# Patient Record
Sex: MALE | Race: WHITE | NOT HISPANIC OR LATINO | Employment: FULL TIME | URBAN - NONMETROPOLITAN AREA
[De-identification: names, ages, dates, MRNs, and addresses within clinical notes are randomized per-mention and may not be internally consistent; named-entity substitution may affect disease eponyms.]

---

## 2021-06-29 ENCOUNTER — HOSPITAL ENCOUNTER (EMERGENCY)
Facility: OTHER | Age: 26
Discharge: HOME | End: 2021-06-29
Attending: PHYSICIAN ASSISTANT | Admitting: PHYSICIAN ASSISTANT
Payer: COMMERCIAL

## 2021-06-29 ENCOUNTER — APPOINTMENT (OUTPATIENT)
Dept: ULTRASOUND IMAGING | Facility: OTHER | Age: 26
End: 2021-06-29
Attending: PHYSICIAN ASSISTANT
Payer: COMMERCIAL

## 2021-06-29 VITALS
DIASTOLIC BLOOD PRESSURE: 93 MMHG | HEART RATE: 94 BPM | RESPIRATION RATE: 18 BRPM | BODY MASS INDEX: 32.29 KG/M2 | WEIGHT: 218 LBS | TEMPERATURE: 97.5 F | OXYGEN SATURATION: 99 % | SYSTOLIC BLOOD PRESSURE: 143 MMHG | HEIGHT: 69 IN

## 2021-06-29 DIAGNOSIS — R36.9 PENILE DISCHARGE: ICD-10-CM

## 2021-06-29 LAB
ALBUMIN UR-MCNC: 10 MG/DL
APPEARANCE UR: CLEAR
BACTERIA #/AREA URNS HPF: ABNORMAL /HPF
BILIRUB UR QL STRIP: NEGATIVE
C TRACH DNA SPEC QL NAA+PROBE: NOT DETECTED
COLOR UR AUTO: ABNORMAL
GLUCOSE UR STRIP-MCNC: NEGATIVE MG/DL
HGB UR QL STRIP: ABNORMAL
KETONES UR STRIP-MCNC: NEGATIVE MG/DL
LEUKOCYTE ESTERASE UR QL STRIP: ABNORMAL
MUCOUS THREADS #/AREA URNS LPF: PRESENT /LPF
N GONORRHOEA DNA SPEC QL NAA+PROBE: NOT DETECTED
NITRATE UR QL: NEGATIVE
PH UR STRIP: 6.5 PH (ref 5–7)
RBC #/AREA URNS AUTO: 35 /HPF (ref 0–2)
SOURCE: ABNORMAL
SP GR UR STRIP: 1.02 (ref 1–1.03)
SPECIMEN SOURCE: NORMAL
UROBILINOGEN UR STRIP-MCNC: 2 MG/DL (ref 0–2)
WBC #/AREA URNS AUTO: 56 /HPF (ref 0–5)
WBC CLUMPS #/AREA URNS HPF: PRESENT /HPF

## 2021-06-29 PROCEDURE — 87661 TRICHOMONAS VAGINALIS AMPLIF: CPT | Performed by: PHYSICIAN ASSISTANT

## 2021-06-29 PROCEDURE — 87086 URINE CULTURE/COLONY COUNT: CPT | Performed by: PHYSICIAN ASSISTANT

## 2021-06-29 PROCEDURE — 87088 URINE BACTERIA CULTURE: CPT | Performed by: PHYSICIAN ASSISTANT

## 2021-06-29 PROCEDURE — 250N000013 HC RX MED GY IP 250 OP 250 PS 637: Performed by: PHYSICIAN ASSISTANT

## 2021-06-29 PROCEDURE — 250N000011 HC RX IP 250 OP 636: Performed by: PHYSICIAN ASSISTANT

## 2021-06-29 PROCEDURE — 86780 TREPONEMA PALLIDUM: CPT | Performed by: PHYSICIAN ASSISTANT

## 2021-06-29 PROCEDURE — 81001 URINALYSIS AUTO W/SCOPE: CPT | Performed by: PHYSICIAN ASSISTANT

## 2021-06-29 PROCEDURE — 87491 CHLMYD TRACH DNA AMP PROBE: CPT | Performed by: PHYSICIAN ASSISTANT

## 2021-06-29 PROCEDURE — 87591 N.GONORRHOEAE DNA AMP PROB: CPT | Performed by: PHYSICIAN ASSISTANT

## 2021-06-29 PROCEDURE — 76870 US EXAM SCROTUM: CPT

## 2021-06-29 PROCEDURE — 96372 THER/PROPH/DIAG INJ SC/IM: CPT | Performed by: PHYSICIAN ASSISTANT

## 2021-06-29 PROCEDURE — 99283 EMERGENCY DEPT VISIT LOW MDM: CPT | Performed by: PHYSICIAN ASSISTANT

## 2021-06-29 PROCEDURE — 99284 EMERGENCY DEPT VISIT MOD MDM: CPT | Mod: 25 | Performed by: PHYSICIAN ASSISTANT

## 2021-06-29 PROCEDURE — 36415 COLL VENOUS BLD VENIPUNCTURE: CPT | Performed by: PHYSICIAN ASSISTANT

## 2021-06-29 PROCEDURE — 99284 EMERGENCY DEPT VISIT MOD MDM: CPT | Performed by: PHYSICIAN ASSISTANT

## 2021-06-29 RX ORDER — METRONIDAZOLE 500 MG/1
500 TABLET ORAL ONCE
Status: COMPLETED | OUTPATIENT
Start: 2021-06-29 | End: 2021-06-29

## 2021-06-29 RX ORDER — DOXYCYCLINE 100 MG/1
100 CAPSULE ORAL ONCE
Status: COMPLETED | OUTPATIENT
Start: 2021-06-29 | End: 2021-06-29

## 2021-06-29 RX ORDER — DOXYCYCLINE 100 MG/1
100 CAPSULE ORAL 2 TIMES DAILY
Qty: 14 CAPSULE | Refills: 0 | Status: SHIPPED | OUTPATIENT
Start: 2021-06-29 | End: 2021-07-06

## 2021-06-29 RX ORDER — METRONIDAZOLE 500 MG/1
500 TABLET ORAL 2 TIMES DAILY
Qty: 14 TABLET | Refills: 0 | Status: SHIPPED | OUTPATIENT
Start: 2021-06-29 | End: 2021-07-06

## 2021-06-29 RX ADMIN — CEFTRIAXONE SODIUM 500 MG: 500 INJECTION, POWDER, FOR SOLUTION INTRAMUSCULAR; INTRAVENOUS at 20:17

## 2021-06-29 RX ADMIN — METRONIDAZOLE 500 MG: 500 TABLET, FILM COATED ORAL at 20:35

## 2021-06-29 RX ADMIN — DOXYCYCLINE 100 MG: 100 CAPSULE ORAL at 20:18

## 2021-06-29 ASSESSMENT — ENCOUNTER SYMPTOMS
CHILLS: 0
CHEST TIGHTNESS: 0
WOUND: 0
BACK PAIN: 0
CONFUSION: 0
BRUISES/BLEEDS EASILY: 0
SHORTNESS OF BREATH: 0
HEMATURIA: 0
FEVER: 0
ABDOMINAL PAIN: 0
ADENOPATHY: 0

## 2021-06-29 ASSESSMENT — MIFFLIN-ST. JEOR: SCORE: 1969.22
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
## 2021-06-30 LAB
SPECIMEN SOURCE: NORMAL
T PALLIDUM AB SER QL: NONREACTIVE
T VAGINALIS DNA SPEC QL NAA+PROBE: NORMAL

## 2021-06-30 NOTE — DISCHARGE INSTRUCTIONS
Get plenty of fluids and rest.  As we discussed you were negative for STDs were able to test for today.  Do have a lab test that is pending.  Your ultrasound appeared very well with no concerning findings.  However, your urine did show signs of infection such as increased white cells and bacteria however this would be very odd for otherwise healthy male did have a UTI more than likely you are suffering from an STD.  I recommend no further sexual intercourse until your symptoms completely resolved.  Referral was placed for you to follow-up with urology for reassessment.  Take your antibiotics as directed.  Return if there are worsening concerning symptoms.

## 2021-06-30 NOTE — ED TRIAGE NOTES
"ED Nursing Triage Note (General)   ________________________________    Arnold Santos is a 26 year old Male that presents to triage private car  With history of  Left testicle pain that started today. Pt says he had burning with urination on Saturday but then it cleared up, today it started to burn again and now he his left testicle hurts. Pt denies swelling or discoloration, says he had some cream colored discharge that he was able to squeeze out, reported by patient   Significant symptoms had onset 3 day(s) ago.  BP (!) 163/104   Pulse 100   Temp 97.5  F (36.4  C) (Temporal)   Resp 18   Ht 1.753 m (5' 9\")   Wt 98.9 kg (218 lb)   SpO2 98%   BMI 32.19 kg/m  t  Patient appears alert , in no acute distress., and cooperative behavior.    GCS Total = 15  Airway: intact  Breathing noted as Normal  Circulation Normal  Skin:  Normal  Action taken:  Triage to critical care immediately  "

## 2021-07-01 LAB
BACTERIA SPEC CULT: ABNORMAL
SPECIMEN SOURCE: ABNORMAL

## 2021-07-05 ENCOUNTER — TELEPHONE (OUTPATIENT)
Dept: EMERGENCY MEDICINE | Facility: OTHER | Age: 26
End: 2021-07-05

## 2021-07-05 NOTE — TELEPHONE ENCOUNTER
[facility] Emergency Department Lab result notification     Patient/parent Name  Arnold    Reason for call  Patient requesting lab result    Lab Result  Final Urine Culture Report on 7/1/21  [facility] Emergency Dept discharge antibiotic prescribed: Doxycycline 100 mg PO tablet, 1 tablet (100 mg) by mouth 2 times daily for 7 days  #1. Bacteria, 50,000 to 100,000 colonies/mL Escherichia coli, is SUSCEPTIBLE to Antibiotic.    Recommendations in treatment per [facility] ED lab result Urine Culture protocol.     All other labs were negative (Gonorrhea, Chlamydia, Treponema, and Trichmans)    Current symptoms  Symptoms are improving.  Has some mild discomfort in scrotum    Recommendations/Instructions  Contact your PCP clinic or return to the Emergency department if your:    Symptoms do not improved after 3 days on antibiotic.    Symptoms do not resolve after completing antibiotic.    Symptoms worsen or other concerning symptom's.    Simón Jean RN  [facility] [place]  Emergency Dept Lab Result RN  Ph# [phone]     Copy of Lab result     Urine Culture Aerobic Bacterial  Order: [id number]  Status:  Final result   Visible to patient:  No (inaccessible [facility]   Dx:  Penile discharge  Specimen Information: Urine, Voided; Midstream Urine        (important suggestion)  Newer results are available. Click to view them now.   Component 6d ago   Specimen Description Midstream Urine    Culture Micro Abnormal   50,000 to 100,000 colonies/mL   Escherichia coli     Resulting Agency [facility]   Susceptibility     Escherichia coli     EDUARDO     Amoxicillin/Clav <=8/4 ug/mL Sensitive     AMPICILLIN >16 ug/mL Resistant     AMPICILLIN/SULBACTAM >16/8 ug/mL Resistant     AZTREONAM <=4 ug/mL Sensitive     CEFAZOLIN <=2 ug/mL Sensitive1     CEFEPIME <=2 ug/mL Sensitive     CEFOXITIN <=8 ug/mL Sensitive     Cefpodoxime <=2 ug/mL Sensitive      CEFTAZIDIME <=1 ug/mL Sensitive     CEFTRIAXONE <=1 ug/mL Sensitive     CEFUROXIME <=4 ug/mL Sensitive     CIPROFLOXACIN <=1 ug/mL Sensitive     ERTAPENEM <=0.5 ug/mL Sensitive     GENTAMICIN <=4 ug/mL Sensitive     IMIPENEM <=1 ug/mL Sensitive     LEVOFLOXACIN <=2 ug/mL Sensitive     NITROFURANTOIN <=32 ug/mL Sensitive     Piperacillin/Tazo <=16 ug/mL Sensitive     TETRACYCLINE <=4 ug/mL Sensitive     TOBRAMYCIN <=4 ug/mL Sensitive     TRIMETHOPRIM  Sensitive2     Trimethoprim/Sulfa <=2/38 ug/mL Sensitive           1 Cefazolin EDUARDO breakpoints are for the treatment of uncomplicated urinary tract    infections.  For the treatment of systemic infections, please contact the   laboratory for additional testing.   2 (Escherichia coli/TRIMETHOPRIM)    <=8             Specimen Collected: 06/29/21  8:22 PM   Last Resulted: 07/01/21 12:42 PM

## 2021-07-27 ENCOUNTER — OFFICE VISIT (OUTPATIENT)
Dept: FAMILY MEDICINE | Facility: OTHER | Age: 26
End: 2021-07-27
Attending: PHYSICIAN ASSISTANT
Payer: COMMERCIAL

## 2021-07-27 ENCOUNTER — HOSPITAL ENCOUNTER (OUTPATIENT)
Dept: ULTRASOUND IMAGING | Facility: OTHER | Age: 26
End: 2021-07-27
Attending: NURSE PRACTITIONER
Payer: COMMERCIAL

## 2021-07-27 VITALS
HEART RATE: 112 BPM | HEIGHT: 69 IN | BODY MASS INDEX: 32.47 KG/M2 | RESPIRATION RATE: 18 BRPM | OXYGEN SATURATION: 98 % | WEIGHT: 219.2 LBS | DIASTOLIC BLOOD PRESSURE: 82 MMHG | TEMPERATURE: 100.7 F | SYSTOLIC BLOOD PRESSURE: 135 MMHG

## 2021-07-27 DIAGNOSIS — N50.89 TESTICULAR SWELLING, RIGHT: ICD-10-CM

## 2021-07-27 DIAGNOSIS — N30.01 ACUTE CYSTITIS WITH HEMATURIA: ICD-10-CM

## 2021-07-27 DIAGNOSIS — R39.89 URINARY PROBLEM: Primary | ICD-10-CM

## 2021-07-27 LAB
ALBUMIN UR-MCNC: 20 MG/DL
APPEARANCE UR: ABNORMAL
BACTERIA #/AREA URNS HPF: ABNORMAL /HPF
BILIRUB UR QL STRIP: NEGATIVE
COLOR UR AUTO: ABNORMAL
GLUCOSE UR STRIP-MCNC: NEGATIVE MG/DL
HGB UR QL STRIP: ABNORMAL
KETONES UR STRIP-MCNC: NEGATIVE MG/DL
LEUKOCYTE ESTERASE UR QL STRIP: ABNORMAL
MUCOUS THREADS #/AREA URNS LPF: PRESENT /LPF
NITRATE UR QL: NEGATIVE
PH UR STRIP: 7 [PH] (ref 5–9)
RBC URINE: 57 /HPF
SP GR UR STRIP: 1.02 (ref 1–1.03)
UROBILINOGEN UR STRIP-MCNC: NORMAL MG/DL
WBC CLUMPS #/AREA URNS HPF: PRESENT /HPF
WBC URINE: >182 /HPF

## 2021-07-27 PROCEDURE — 76870 US EXAM SCROTUM: CPT

## 2021-07-27 PROCEDURE — 99213 OFFICE O/P EST LOW 20 MIN: CPT | Performed by: NURSE PRACTITIONER

## 2021-07-27 PROCEDURE — 87086 URINE CULTURE/COLONY COUNT: CPT | Mod: ZL | Performed by: NURSE PRACTITIONER

## 2021-07-27 PROCEDURE — 81001 URINALYSIS AUTO W/SCOPE: CPT | Mod: ZL | Performed by: NURSE PRACTITIONER

## 2021-07-27 PROCEDURE — 87186 SC STD MICRODIL/AGAR DIL: CPT | Mod: ZL | Performed by: NURSE PRACTITIONER

## 2021-07-27 PROCEDURE — 93976 VASCULAR STUDY: CPT

## 2021-07-27 RX ORDER — SULFAMETHOXAZOLE/TRIMETHOPRIM 800-160 MG
1 TABLET ORAL 2 TIMES DAILY
Qty: 20 TABLET | Refills: 0 | Status: SHIPPED | OUTPATIENT
Start: 2021-07-27 | End: 2021-08-06

## 2021-07-27 ASSESSMENT — MIFFLIN-ST. JEOR: SCORE: 1974.66

## 2021-07-27 ASSESSMENT — PAIN SCALES - GENERAL: PAINLEVEL: MODERATE PAIN (4)

## 2021-07-27 NOTE — PROGRESS NOTES
ASSESSMENT/PLAN:    I have reviewed the nursing notes.  I have reviewed the findings, diagnosis, plan and need for follow up with the patient.    *** {Dia Picklist:[id number]}  ***   Discussed with patient viral vs bacterial respiratory illness, and evidence based practice and guidelines for cough without fever or infiltrate on xray are not indicative of pneumonia and should not be treated with antibiotics.    *** Discussed with patient that symptoms and exam are consistent with viral illness.  Discussed that symptomatic treatment of cough is appropriate but not with antibiotics.      *** Symptomatic treatment - Encouraged fluids, salt water gargles, honey (only if greater than 1 year in age due to risk of botulism), elevation, humidifier, sinus rinse/netti pot, lozenges, tea, topical vapor rub, popsicles, rest, etc     *** May use over-the-counter Tylenol or ibuprofen PRN    Discussed warning signs/symptoms indicative of need to f/u    Follow up if symptoms persist or worsen or concerns    I explained my diagnostic considerations and recommendations to the patient, who voiced understanding and agreement with the treatment plan. All questions were answered. We discussed potential side effects of any prescribed or recommended therapies, as well as expectations for response to treatments.    Adelina Hill NP  7/27/2021  4:53 PM    HPI:  Arnold Santos is a 26 year old male who presents to [facility] today for concerns of testical swelling and blood in his semen. He was seen by LIZBETH Sexton in ED on 6/29/21 per chart review and was treated at that time with a shot of Rocephin, oral doxycycline, and oral Flagyl.  Provider was concerned for STDs at that time.  Also urology referral was placed.  It appears he has a urology appointment scheduled for Monday, August 2, 2021.    History reviewed. No pertinent past medical history.  History reviewed. No pertinent surgical history.  Social History     Tobacco Use      "Smoking status: Never Smoker     Smokeless tobacco: Never Used   Substance Use Topics     Alcohol use: Yes     Comment: occasional     No current outpatient medications on file.     Allergies   Allergen Reactions     Bee Venom Swelling     Past medical history, past surgical history, current medications and allergies reviewed and accurate to the best of my knowledge.      ROS:  Refer to HPI    /82   Pulse 112   Temp (!) 100.7  F (38.2  C) (Tympanic)   Resp 18   Ht 1.753 m (5' 9\")   Wt 99.4 kg (219 lb 3.2 oz)   SpO2 98%   BMI 32.37 kg/m      EXAM:  General Appearance: Well appearing 26 year old male, appropriate appearance for age. No acute distress  Ears: Left TM intact, translucent with bony landmarks appreciated, no erythema, no effusion, no bulging, no purulence.  Right TM intact, translucent with bony landmarks appreciated, no erythema, no effusion, no bulging, no purulence.  Left auditory canal clear.  Right auditory canal clear.  Normal external ears, non tender.  Eyes: conjunctivae normal without erythema or irritation, corneas clear, no drainage or crusting, no eyelid swelling, pupils equal   Orophayrnx: moist mucous membranes, posterior pharynx without erythema, tonsils without hypertrophy, no erythema, no exudates or petechiae, no post nasal drip seen, no trismus, voice clear.    Sinuses:  No sinus tenderness upon palpation of the frontal or maxillary sinuses  Nose:  Bilateral nares: no erythema, no edema, no drainage or congestion   Neck: supple without adenopathy  Respiratory: normal chest wall and respirations.  Normal effort.  Clear to auscultation bilaterally, no wheezing, crackles or rhonchi.  No increased work of breathing.  No cough appreciated.  Cardiac: RRR with no murmurs  Abdomen: soft, nontender, no rigidity, no rebound tenderness or guarding, normal bowel sounds present  :  No suprapubic tenderness to palpation.  No CVA tenderness to palpation.    Musculoskeletal:  Equal " movement of bilateral upper extremities.  Equal movement of bilateral lower extremities.  Normal gait.    Dermatological: no rashes noted of exposed skin  Psychological: normal affect, alert, oriented, and pleasant.

## 2021-07-27 NOTE — NURSING NOTE
"Chief Complaint   Patient presents with     Urinary Problem     Patient is here for a swollen right testicle that started this morning and blood in his semen that happened last night. Patient does not believe there was blood in his urine.     Initial /82   Pulse 112   Temp (!) 100.7  F (38.2  C) (Tympanic)   Resp 18   Ht 1.753 m (5' 9\")   Wt 99.4 kg (219 lb 3.2 oz)   SpO2 98%   BMI 32.37 kg/m   Estimated body mass index is 32.37 kg/m  as calculated from the following:    Height as of this encounter: 1.753 m (5' 9\").    Weight as of this encounter: 99.4 kg (219 lb 3.2 oz).  Medication Reconciliation: complete    Diamond Ray LPN  "

## 2021-07-27 NOTE — PATIENT INSTRUCTIONS
Treating you with a full course of antibiotic for today's urinary tract infection. Take 10 days of bactrim, full course.   Follow up with Dr. Spivey as scheduled for 8/2/21.  Unchanged ultrasound today as compared to the one one month ago in the ED.
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
## 2021-07-29 LAB — BACTERIA UR CULT: ABNORMAL

## 2021-08-09 ENCOUNTER — OFFICE VISIT (OUTPATIENT)
Dept: UROLOGY | Facility: OTHER | Age: 26
End: 2021-08-09
Attending: UROLOGY
Payer: COMMERCIAL

## 2021-08-09 VITALS
HEART RATE: 80 BPM | DIASTOLIC BLOOD PRESSURE: 80 MMHG | BODY MASS INDEX: 33.02 KG/M2 | SYSTOLIC BLOOD PRESSURE: 110 MMHG | OXYGEN SATURATION: 98 % | WEIGHT: 223.6 LBS | RESPIRATION RATE: 16 BRPM

## 2021-08-09 DIAGNOSIS — N39.0 ACUTE URINARY TRACT INFECTION: Primary | ICD-10-CM

## 2021-08-09 PROCEDURE — 99203 OFFICE O/P NEW LOW 30 MIN: CPT | Performed by: UROLOGY

## 2021-08-09 ASSESSMENT — PAIN SCALES - GENERAL: PAINLEVEL: NO PAIN (0)

## 2021-08-09 NOTE — PROGRESS NOTES
Type of Visit  NPV    Chief Complaint  UTI    HPI  Mr. Santos is a 26 year old male who presents after a recent visit to the emergency department where he was diagnosed with a UTI.  Patient denies gross hematuria, current dysuria or fevers.  When he presented to emergency department he had urgency and dysuria at that time.  UA was positive for pyuria and he was started on a course of antibiotics.  His symptoms have since improved.    No other associated symptoms.      Past Medical History  He  has no past medical history on file.  There is no problem list on file for this patient.    Past Surgical History  He  has no past surgical history on file.    Medications  He currently has no medications in their medication list.    Allergies  Allergies   Allergen Reactions     Bee Venom Swelling     Social History  He  reports that he has never smoked. He has never used smokeless tobacco. He reports current alcohol use. He reports that he does not use drugs.  No drug abuse.    Family History  No family history on file.    Review of Systems  I personally reviewed the ROS with the patient.    Nursing Notes:   Betsey Oswald LPN  8/9/2021  2:47 PM  Signed  Chief Complaint   Patient presents with     Consult     UTI/ Testicular pain      Patient presents to the clinic today for a consult for UTI/ testicular pain     Review of Systems:    Weight loss:    No     Recent fever/chills:  No   Night sweats:   No  Current skin rash:  No   Recent hair loss:  No  Heat intolerance:  No   Cold intolerance:  No  Chest pain:   No   Palpitations:   No  Shortness of breath:  No   Wheezing:   No  Constipation:    No   Diarrhea:   No   Nausea:   No   Vomiting:   No   Kidney/side pain:  No   Back pain:   No  Frequent headaches:  No   Dizziness:     No  Leg swelling:   No   Calf pain:    No    Parents, brothers or sisters with history of kidney cancer:   No  Parents, brothers or sisters with history of bladder cancer: No     Medication  Reconciliation: completed   Betsey Oswald LPN  8/9/2021 2:47 PM       Physical Exam  Vitals:    08/09/21 1449   BP: 110/80   BP Location: Right arm   Patient Position: Sitting   Cuff Size: Adult Large   Pulse: 80   Resp: 16   SpO2: 98%   Weight: 101.4 kg (223 lb 9.6 oz)     Constitutional: No acute distress.  Alert and cooperative   Head: NCAT  Eyes: Conjunctivae normal  Cardiovascular: Regular rate.  Pulmonary/Chest: Respirations are even and non-labored bilaterally, no audible wheezing  Abdominal: Soft. No distension, tenderness, masses or guarding.   Neurological: A + O x 3.  Cranial Nerves II-XII grossly intact.  Extremities: ANGEL LUIS x 4, Warm. No clubbing.  No cyanosis.    Skin: Pink, warm and dry.  No visible rashes noted.  Psychiatric:  Normal mood and affect  Back:  No left CVA tenderness.  No right CVA tenderness.  Genitourinary:  Nonpalpable bladder    Labs  Results for MAGGI MENSAH (MRN [id number]) as of 8/9/2021 14:56   Ref. Range 6/29/2021 18:24 6/29/2021 18:31 6/29/2021 20:22 6/29/2021 20:55 7/27/2021 16:31   Color Urine Latest Ref Range: Colorless, Straw, Light Yellow, Yellow  Light Yellow    Light Yellow   Appearance Urine Latest Ref Range: Clear  Clear    Slightly Cloudy (A)   Glucose Urine Latest Ref Range: Negative mg/dL Negative    Negative   Bilirubin Urine Latest Ref Range: Negative  Negative    Negative   Ketones Urine Latest Ref Range: Negative mg/dL Negative    Negative   Specific Gravity Urine Latest Ref Range: 1.000 - 1.030  1.018    1.016   pH Urine Latest Ref Range: 5.0 - 9.0  6.5    7.0   Protein Albumin Urine Latest Ref Range: Negative mg/dL 10 (A)    20 (A)   Urobilinogen mg/dL Latest Ref Range: Normal, 2.0 mg/dL 2.0    Normal   Nitrite Urine Latest Ref Range: Negative  Negative    Negative   Blood Urine Latest Ref Range: Negative  Moderate (A)    Small (A)   Leukocyte Esterase Urine Latest Ref Range: Negative  Large (A)    Large (A)   Source Unknown Midstream Urine       WBC Urine  Latest Ref Range: <=5 /HPF 56 (H)    >182 (H)   RBC Urine Latest Ref Range: <=2 /HPF 35 (H)    57 (H)   Bacteria Urine Latest Ref Range: NEG^Negative /HPF Few (A)       Bacteria Urine Latest Ref Range: None Seen /HPF     Moderate (A)   WBC Clumps Latest Ref Range: None Seen /HPF Present (A)    Present (A)   Mucous Urine Latest Ref Range: NEG^Negative /LPF Present (A)       Mucus Urine Latest Ref Range: None Seen /LPF     Present (A)   Culture Micro Unknown   50,000 to 100,000... (A)     Specimen Description Unknown   Midstream Urine Urine    Treponema Antibodies Latest Ref Range: NR^Nonreactive    Nonreactive         Assessment& Plan  Mr. Santos is a 26 year old male who presents after recently being diagnosed with a UTI.  Labs reviewed and antibiotic regimen is effective.  Complete course of antibiotics

## 2021-08-09 NOTE — NURSING NOTE
Chief Complaint   Patient presents with     Consult     UTI/ Testicular pain      Patient presents to the clinic today for a consult for UTI/ testicular pain     Review of Systems:    Weight loss:    No     Recent fever/chills:  No   Night sweats:   No  Current skin rash:  No   Recent hair loss:  No  Heat intolerance:  No   Cold intolerance:  No  Chest pain:   No   Palpitations:   No  Shortness of breath:  No   Wheezing:   No  Constipation:    No   Diarrhea:   No   Nausea:   No   Vomiting:   No   Kidney/side pain:  No   Back pain:   No  Frequent headaches:  No   Dizziness:     No  Leg swelling:   No   Calf pain:    No    Parents, brothers or sisters with history of kidney cancer:   No  Parents, brothers or sisters with history of bladder cancer: No     Medication Reconciliation: completed   Betsey Oswald LPN  8/9/2021 2:47 PM

## (undated) RX ORDER — CEFTRIAXONE 500 MG/1
INJECTION, POWDER, FOR SOLUTION INTRAMUSCULAR; INTRAVENOUS
Status: DISPENSED
Start: 2021-06-29

## (undated) RX ORDER — METRONIDAZOLE 500 MG/1
TABLET ORAL
Status: DISPENSED
Start: 2021-06-29

## (undated) RX ORDER — DOXYCYCLINE 100 MG/1
CAPSULE ORAL
Status: DISPENSED
Start: 2021-06-29